# Patient Record
Sex: FEMALE
[De-identification: names, ages, dates, MRNs, and addresses within clinical notes are randomized per-mention and may not be internally consistent; named-entity substitution may affect disease eponyms.]

---

## 2022-06-11 ENCOUNTER — HOSPITAL ENCOUNTER (EMERGENCY)
Dept: HOSPITAL 5 - ED | Age: 37
LOS: 1 days | Discharge: HOME | End: 2022-06-12
Payer: MEDICARE

## 2022-06-11 DIAGNOSIS — M54.50: Primary | ICD-10-CM

## 2022-06-11 PROCEDURE — 99283 EMERGENCY DEPT VISIT LOW MDM: CPT

## 2022-06-11 PROCEDURE — 81001 URINALYSIS AUTO W/SCOPE: CPT

## 2022-06-12 VITALS — SYSTOLIC BLOOD PRESSURE: 118 MMHG | DIASTOLIC BLOOD PRESSURE: 80 MMHG

## 2022-06-12 LAB
BILIRUB UR QL STRIP: (no result)
BLOOD UR QL VISUAL: (no result)
PH UR STRIP: 6 [PH] (ref 5–7)
PROT UR STRIP-MCNC: (no result) MG/DL
RBC #/AREA URNS HPF: 2 /HPF (ref 0–6)
UROBILINOGEN UR-MCNC: < 2 MG/DL (ref ?–2)
WBC #/AREA URNS HPF: 0 /HPF (ref 0–6)

## 2022-06-12 NOTE — EMERGENCY DEPARTMENT REPORT
ED Back Pain/Injury HPI





- General


Chief Complaint: Back Pain/Injury


Stated Complaint: MAJOR BACK PAIN


Time Seen by Provider: 06/12/22 05:04


Source: patient


Limitations: No Limitations





- History of Present Illness


Initial Comments: 





37-year-old F American female presents emerged department complaining of pain to

her lower back area worse with palpation and and and range of motion.  Thanks 

the pain may be related to dehydration due to her decreased food and fluid 

intake as she has been sleeping and that shelter over the last few days.  Pain 

started after she started sleeping in the shelter she reports no loss of bowel 

bladder, no saddle paresthesia no increased urination no decreased urinary 

production or increased frequency.  She reports no hematuria no hematemesis 

hematochezia, no cough cold or congestion.


MD Complaint: back pain


-: Gradual


Radiation: none


Quality: dull, aching


Consistency: constant


Improves With: none


Worsens With: none


Context: unknown, other (Sleeping in a shelter but thinks she may have been 

dehydrated due to her decreased food and fluid intake)


Associated Symptoms: denies other symptoms.  denies: weakness, chest pain, 

numbness, fever/chills, headaches, abdominal pain, rash, shortness of breath





- Related Data


                                  Previous Rx's











 Medication  Instructions  Recorded  Last Taken  Type


 


Ketorolac [Toradol] 10 mg PO Q6H PRN #14 06/12/22 Unknown Rx











                                    Allergies











Allergy/AdvReac Type Severity Reaction Status Date / Time


 


No Known Allergies Allergy   Unverified 06/11/22 21:15














ED Review of Systems


ROS: 


Stated complaint: MAJOR BACK PAIN


Other details as noted in HPI





Comment: All other systems reviewed and negative





ED Past Medical Hx





- Past Medical History


Previous Medical History?: No





- Surgical History


Past Surgical History?: No





- Medications


Home Medications: 


                                Home Medications











 Medication  Instructions  Recorded  Confirmed  Last Taken  Type


 


Ketorolac [Toradol] 10 mg PO Q6H PRN #14 06/12/22  Unknown Rx














ED Physical Exam





- General


Limitations: No Limitations


General appearance: alert, in no apparent distress





- Head


Head exam: Present: atraumatic, normocephalic





- Eye


Eye exam: Present: normal appearance, PERRL, EOMI


Pupils: Present: normal accommodation





- ENT


ENT exam: Present: normal exam, normal orophraynx, mucous membranes moist, TM's 

normal bilaterally





- Neck


Neck exam: Present: normal inspection, full ROM





- Respiratory


Respiratory exam: Present: normal lung sounds bilaterally.  Absent: respiratory 

distress, wheezes, rales, chest wall tenderness, accessory muscle use





- Cardiovascular


Cardiovascular Exam: Present: regular rate, normal rhythm.  Absent: systolic 

murmur, diastolic murmur, rubs, gallop





- GI/Abdominal


GI/Abdominal exam: Present: soft, normal bowel sounds





- Extremities Exam


Extremities exam: Present: normal inspection





- Back Exam


Back exam: Present: normal inspection, paraspinal tenderness.  Absent: CVA 

tenderness (R), CVA tenderness (L), muscle spasm





- Neurological Exam


Neurological exam: Present: alert, oriented X3, CN II-XII intact





- Psychiatric


Psychiatric exam: Present: normal affect, normal mood





- Skin


Skin exam: Present: warm, dry, intact, normal color.  Absent: rash





ED Course





                                   Vital Signs











  06/11/22





  21:15


 


Temperature 99.0 F


 


Pulse Rate 103 H


 


Respiratory 18





Rate 


 


Blood Pressure 144/110


 


O2 Sat by Pulse 97





Oximetry 














ED Medical Decision Making





- Medical Decision Making





Pt presents the emergency department complaining of back pain most consistent 

with nonemergent back Pain .  Differential Diagnosis Includes Lumbar Go Versus 

Musculoskeletal Spasm, Strain Versus Sciatica.  No Back Pain Red Flags on 

History or Physical.  Presentation Not Consistent with Malignancy, Fracture, 

Cauda Equina, Abdominal Aortic Aneurysm, Viscus Perforation, Pulmonary Embolism,

 Renal Colic, Pyelonephritis.  Patient reports no B symptoms, trauma trauma, 

incontinence, saddle anesthesia, distal weakness, urinary symptoms and  is a 

febrile.


Critical care attestation.: 


If time is entered above; I have spent that time in minutes in the direct care 

of this critically ill patient, excluding procedure time.








ED Disposition


Clinical Impression: 


 Back pain





Disposition: 01 HOME / SELF CARE / HOMELESS


Is pt being admited?: No


Does the pt Need Aspirin: No


Condition: Stable


Instructions:  Acute Back Pain, Adult


Additional Instructions: 


You have been evaluate emergency department seeking follow-up back pain no 

evidence of any dehydration was discovered.  No urinary tract infection present 

as was initial suspicion.  Will be treated accordingly with musculoskeletal 

relieving medications please be sure to follow-up with your primary care 

provider for further evaluation and management of this nonemergent back pain


Prescriptions: 


Ketorolac [Toradol] 10 mg PO Q6H PRN #14


 PRN Reason: Pain


Referrals: 


[facility] [Provider Group] - 3-5 Days